# Patient Record
Sex: MALE | Race: BLACK OR AFRICAN AMERICAN | NOT HISPANIC OR LATINO | Employment: OTHER | URBAN - METROPOLITAN AREA
[De-identification: names, ages, dates, MRNs, and addresses within clinical notes are randomized per-mention and may not be internally consistent; named-entity substitution may affect disease eponyms.]

---

## 2017-12-31 ENCOUNTER — HOSPITAL ENCOUNTER (EMERGENCY)
Facility: HOSPITAL | Age: 32
Discharge: HOME | End: 2017-12-31
Payer: COMMERCIAL

## 2017-12-31 VITALS
OXYGEN SATURATION: 98 % | HEART RATE: 60 BPM | RESPIRATION RATE: 18 BRPM | WEIGHT: 208.13 LBS | TEMPERATURE: 99 F | SYSTOLIC BLOOD PRESSURE: 121 MMHG | DIASTOLIC BLOOD PRESSURE: 76 MMHG | BODY MASS INDEX: 27.59 KG/M2 | HEIGHT: 73 IN
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
## 2017-12-31 DIAGNOSIS — M54.41 ACUTE RIGHT-SIDED LOW BACK PAIN WITH RIGHT-SIDED SCIATICA: Primary | ICD-10-CM

## 2017-12-31 PROCEDURE — 99283 EMERGENCY DEPT VISIT LOW MDM: CPT

## 2017-12-31 RX ORDER — NAPROXEN 500 MG/1
500 TABLET ORAL 2 TIMES DAILY WITH MEALS
Qty: 20 TABLET | Refills: 0 | Status: SHIPPED | OUTPATIENT
Start: 2017-12-31

## 2017-12-31 RX ORDER — METHOCARBAMOL 500 MG/1
1000 TABLET, FILM COATED ORAL 3 TIMES DAILY
Qty: 30 TABLET | Refills: 0 | Status: SHIPPED | OUTPATIENT
Start: 2017-12-31 | End: 2018-01-05

## 2018-01-01 NOTE — ED PROVIDER NOTES
SCRIBE #1 NOTE: I, Radha Ayers, am scribing for, and in the presence of, Mc Mixon Jr., FNP. I have scribed the entire note.      History      Chief Complaint   Patient presents with    Hip Pain     fell while at work approx 1 month ago, pt reports stiffness to R hip       Review of patient's allergies indicates:  No Known Allergies     HPI   HPI    12/31/2017, 6:13 PM   History obtained from the patient      History of Present Illness: Velasquez Farias is a 32 y.o. male patient who presents to the Emergency Department for R hip pain which onset gradually 1 month ago. Symptoms are constant and moderate in severity. Pt reports falling at work 1 month ago. Pt states hip pain has started radiating down to his knee. Sxs exacerbate with sitting down and bending over to touch his toes. No mitigating factors reported. No associated sxs. Patient denies any extremity weakness/numbness, paresthesias, back pain, fever, chills, saddle anesthesia, bowel/bladder incontinence, and all other sxs at this time. Pt reports no prior Tx. No further complaints or concerns at this time.         Arrival mode: Personal vehicle      PCP: Primary Doctor No       Past Medical History:  Reviewed past medical history, not pertinent.    Past Surgical History:  Reviewed past surgical history, not pertinent.       Family History:  Reviewed family history, not pertinent.     Social History:  Social History     Social History Main Topics    Smoking status: Not given    Smokeless tobacco: Not given    Alcohol use Not given    Drug use: Not given    Sexual activity: Not given       ROS   Review of Systems   Constitutional: Negative for chills and fever.   HENT: Negative for sore throat.    Respiratory: Negative for shortness of breath.    Cardiovascular: Negative for chest pain.   Gastrointestinal: Negative for nausea.   Genitourinary: Negative for dysuria.        (-) bowel/bladder incontinence    Musculoskeletal: Negative for back pain.  "       (+) R hip pain radiates to R knee   Skin: Negative for rash.   Neurological: Negative for weakness and numbness.        (-) paresthesias  (-) saddle anesthesia    Hematological: Does not bruise/bleed easily.   All other systems reviewed and are negative.    Physical Exam      Initial Vitals [12/31/17 1738]   BP Pulse Resp Temp SpO2   121/76 60 18 98.6 °F (37 °C) 98 %      MAP       91          Physical Exam  Nursing Notes and Vital Signs Reviewed.  Constitutional: Patient is in no apparent distress. Well-developed and well-nourished.  Head: Atraumatic. Normocephalic.  Eyes: PERRL. EOM intact. Conjunctivae are not pale. No scleral icterus.  ENT: Mucous membranes are moist. Oropharynx is clear and symmetric.    Neck: Supple. Full ROM. No lymphadenopathy.  Cardiovascular: Regular rate. Regular rhythm. No murmurs, rubs, or gallops. Distal pulses are 2+ and symmetric.  Pulmonary/Chest: No respiratory distress. Clear to auscultation bilaterally. No wheezing or rales.  Abdominal: Soft and non-distended.  There is no tenderness.  No rebound, guarding, or rigidity. Good bowel sounds.  Genitourinary: No CVA tenderness  Musculoskeletal: Moves all extremities. No obvious deformities. No edema. No calf tenderness.  RLE: Pain when bending and sitting. Pain is not reproducible. Cap refill distally is <2 seconds. DP and PT pulses are equal and 2+ bilaterally. No motor deficit. No distal sensory deficit  Skin: Warm and dry.  Neurological:  Alert, awake, and appropriate.  Normal speech.  No acute focal neurological deficits are appreciated.  Psychiatric: Normal affect. Good eye contact. Appropriate in content.    ED Course    Procedures  ED Vital Signs:  Vitals:    12/31/17 1738   BP: 121/76   Pulse: 60   Resp: 18   Temp: 98.6 °F (37 °C)   TempSrc: Oral   SpO2: 98%   Weight: 94.4 kg (208 lb 1.8 oz)   Height: 6' 1" (1.854 m)       Imaging Results:  Imaging Results          X-Ray Lumbar Spine Ap And Lateral (Final result)  " Result time 12/31/17 18:51:31    Final result by Mark Anthony Ye MD (12/31/17 18:51:31)                 Impression:      Low-grade arthritic change.      Electronically signed by: MARK ANTHONY EY MD  Date:     12/31/17  Time:    18:51              Narrative:    EXAM: XR LUMBAR SPINE AP AND LATERAL    CLINICAL HISTORY:  leg pain    COMPARISON EXAMS: none    FINDINGS:  Negative for fracture or dislocation.  No significant arthritic changes.  Mild facet arthropathy L5-S1 with tiny spur at L5.                                      The Emergency Provider reviewed the vital signs and test results, which are outlined above.    ED Discussion   7:05 PM: Reassessed pt at this time. Discussed with pt all pertinent ED information and results. Discussed pt dx and plan of tx. Gave pt all f/u and return to the ED instructions. All questions and concerns were addressed at this time. Pt expresses understanding of information and instructions, and is comfortable with plan to discharge. Pt is stable for discharge.    I discussed with patient and/or family/caretaker that evaluation in the ED does not suggest any emergent or life threatening medical conditions requiring immediate intervention beyond what was provided in the ED, and I believe patient is safe for discharge.  Regardless, an unremarkable evaluation in the ED does not preclude the development or presence of a serious of life threatening condition. As such, patient was instructed to return immediately for any worsening or change in current symptoms.      ED Medication(s):  Medications - No data to display    Discharge Medication List as of 12/31/2017  7:03 PM      START taking these medications    Details   methocarbamol (ROBAXIN) 500 MG Tab Take 2 tablets (1,000 mg total) by mouth 3 (three) times daily., Starting Sun 12/31/2017, Until Fri 1/5/2018, Print      naproxen (NAPROSYN) 500 MG tablet Take 1 tablet (500 mg total) by mouth 2 (two) times daily with meals., Starting Sun  12/31/2017, Print             Follow-up Information     Primary Doctor No In 3 days.           [facility]    Specialty:  Emergency Medicine  Why:  If symptoms worsen  Contact information:  [address]  [facility] [address] [phone]                   Medical Decision Making    Medical Decision Making:   Clinical Tests:   Radiological Study: Reviewed and Ordered           Scribe Attestation:   Scribe #1: I performed the above scribed service and the documentation accurately describes the services I performed. I attest to the accuracy of the note.    Attending:   Physician Attestation Statement for Scribe #1: I, EMILIANO Mac Jr., personally performed the services described in this documentation, as scribed by Radha Ayers, in my presence, and it is both accurate and complete.          Clinical Impression       ICD-10-CM ICD-9-CM   1. Acute right-sided low back pain with right-sided sciatica M54.41 724.2     724.3   2. Fall, initial encounter W19.XXXA E888.9       Disposition:   Disposition: Discharged  Condition: Stable         EMILIANO Mac Jr.  12/31/17 2129